# Patient Record
Sex: MALE | Race: WHITE | NOT HISPANIC OR LATINO | Employment: UNEMPLOYED | URBAN - NONMETROPOLITAN AREA
[De-identification: names, ages, dates, MRNs, and addresses within clinical notes are randomized per-mention and may not be internally consistent; named-entity substitution may affect disease eponyms.]

---

## 2018-07-03 NOTE — PROGRESS NOTES
Subjective    Mr. Nelson is 23 y.o. male    Chief Complaint: Testicular Pain    History of Present Illness     Testalgia  Patient is here for evaluation of testalgia.  The onset of the testicular pain is gradula.  It has been occurring for one year.  Pt. Describes the pain as dull.  Patient rates the pain as 1/10.  Pain is located in the bilateral testicle.  Course has been worsening.  Aggravating factors include exercise.  Alleviating factors include none. Previous urologic workup includes scrotal ultrasound.  Previous treatment includes none.  Pt. did experience relief from treatment.        The following portions of the patient's history were reviewed and updated as appropriate: allergies, current medications, past family history, past medical history, past social history, past surgical history and problem list.    Review of Systems   Constitutional: Negative for appetite change and fever.   HENT: Negative for hearing loss and sore throat.    Eyes: Negative for pain and redness.   Respiratory: Negative for cough and shortness of breath.    Cardiovascular: Negative for chest pain and leg swelling.   Gastrointestinal: Negative for anal bleeding, nausea and vomiting.   Endocrine: Negative for cold intolerance and heat intolerance.   Genitourinary: Positive for testicular pain (L>R). Negative for difficulty urinating, dysuria, flank pain, frequency, hematuria and urgency.   Musculoskeletal: Negative for joint swelling and myalgias.   Skin: Negative for color change and rash.   Allergic/Immunologic: Negative for food allergies and immunocompromised state.   Neurological: Negative for dizziness and speech difficulty.   Hematological: Negative for adenopathy. Does not bruise/bleed easily.   Psychiatric/Behavioral: Negative for dysphoric mood and suicidal ideas.       No current outpatient prescriptions on file.    Past Medical History:   Diagnosis Date   • Patient denies medical problems        Past Surgical History:  "  Procedure Laterality Date   • APPENDECTOMY     • HERNIA REPAIR     • HYDROCELE EXCISION / REPAIR         Social History     Social History   • Marital status: Significant Other     Social History Main Topics   • Smoking status: Never Smoker   • Smokeless tobacco: Never Used   • Alcohol use No   • Drug use: No   • Sexual activity: No     Other Topics Concern   • Not on file       Family History   Problem Relation Age of Onset   • Cancer Neg Hx        Objective    Temp 98.2 °F (36.8 °C)   Ht 188 cm (74\")   Wt 86.5 kg (190 lb 9.6 oz)   BMI 24.47 kg/m²     Physical Exam   Constitutional: He is oriented to person, place, and time. He appears well-developed and well-nourished. No distress.   HENT:   Nose: Nose normal.   Neck: Normal range of motion. Neck supple. No tracheal deviation present. No thyromegaly present.   Cardiovascular: Normal rate, regular rhythm and intact distal pulses.    No significant edema or tenderness    Pulmonary/Chest: Breath sounds normal. No accessory muscle usage. No respiratory distress.   Abdominal: Soft. Bowel sounds are normal. He exhibits no distension, no ascites and no mass. There is no hepatosplenomegaly. There is no tenderness. There is no rebound, no guarding and no CVA tenderness. No hernia.   Stool specimen is not indicated for my portion of the exam   Genitourinary: Testes normal and penis normal. Rectal exam shows no mass, no tenderness, anal tone normal and guaiac negative stool. Tender: no nodules. Right testis shows no mass, no swelling and no tenderness. Left testis shows no mass, no swelling and no tenderness. No penile tenderness (no lesion or deformities).   Genitourinary Comments:  The urethral meatus normal in position without evidence of stricture. Epididymis without mass or tenderness. Vas Deferens is palpably normal.  Grade II varicocele on left.    Lymphadenopathy:     He has no cervical adenopathy. No inguinal adenopathy noted on the right or left side.        " Right: No inguinal adenopathy present.        Left: No inguinal adenopathy present.   Neurological: He is alert and oriented to person, place, and time.   Skin: Skin is warm and dry. No rash noted. He is not diaphoretic. No pallor.   Psychiatric: He has a normal mood and affect. His behavior is normal.   Vitals reviewed.      Scrotal ultrasound independent review    The scrotal ultrasound is available for me to review.  Treatment recommendations require an independent review.  This film has been reviewed by the radiologist to determine any non urologic abnormalities that are presents. Results are as follows:    RIGHT    Testis:  Normal in size and echotexture, without focal lesion    Epididymis:  Normal in size and echotexture, without focal lesion    Hydrocele:  No evidence of hydrocele    Varicocele:  No evidence of varicocele      Left    Testis:  Normal in size and echotexture, without focal lesion    Epididymis:  Normal in size and echotexture, without focal lesion    Hydrocele:  No evidence of hydrocele    Varicocele:  Moderate varicocele          Results for orders placed or performed in visit on 07/05/18   POC Urinalysis Dipstick, Multipro   Result Value Ref Range    Color Yellow Yellow, Straw, Dark Yellow, Paula    Clarity, UA Clear Clear    Glucose, UA Negative Negative, 1000 mg/dL (3+) mg/dL    Bilirubin Negative Negative    Ketones, UA Negative Negative    Specific Gravity  1.025 1.005 - 1.030    Blood, UA Negative Negative    pH, Urine 6.0 5.0 - 8.0    Protein, POC Negative Negative mg/dL    Urobilinogen, UA Normal Normal    Nitrite, UA Negative Negative    Leukocytes Negative Negative     Assessment and Plan    Jeff was seen today for testicle pain.    Diagnoses and all orders for this visit:    Testicular pain  -     POC Urinalysis Dipstick, Multipro    Left varicocele      Patient with a left-sided varicocele.  He does have some testicular pain.  I did discuss with him indications for varicocele  repair.  He feels like he can live with the pain for now.  He is from near [place] and I advised him if he wanted to have this fixed to seek care from urologist near his home.

## 2018-07-05 ENCOUNTER — OFFICE VISIT (OUTPATIENT)
Dept: UROLOGY | Facility: CLINIC | Age: 23
End: 2018-07-05

## 2018-07-05 VITALS — TEMPERATURE: 98.2 F | WEIGHT: 190.6 LBS | BODY MASS INDEX: 24.46 KG/M2 | HEIGHT: 74 IN

## 2018-07-05 DIAGNOSIS — N50.819 TESTICULAR PAIN: Primary | ICD-10-CM

## 2018-07-05 DIAGNOSIS — I86.1 LEFT VARICOCELE: ICD-10-CM

## 2018-07-05 LAB
BILIRUB BLD-MCNC: NEGATIVE MG/DL
CLARITY, POC: CLEAR
COLOR UR: YELLOW
GLUCOSE UR STRIP-MCNC: NEGATIVE MG/DL
KETONES UR QL: NEGATIVE
LEUKOCYTE EST, POC: NEGATIVE
NITRITE UR-MCNC: NEGATIVE MG/ML
PH UR: 6 [PH] (ref 5–8)
PROT UR STRIP-MCNC: NEGATIVE MG/DL
RBC # UR STRIP: NEGATIVE /UL
SP GR UR: 1.02 (ref 1–1.03)
UROBILINOGEN UR QL: NORMAL

## 2018-07-05 PROCEDURE — 99243 OFF/OP CNSLTJ NEW/EST LOW 30: CPT | Performed by: UROLOGY

## 2018-07-05 PROCEDURE — 81001 URINALYSIS AUTO W/SCOPE: CPT | Performed by: UROLOGY
